# Patient Record
Sex: FEMALE | Race: OTHER | HISPANIC OR LATINO | URBAN - METROPOLITAN AREA
[De-identification: names, ages, dates, MRNs, and addresses within clinical notes are randomized per-mention and may not be internally consistent; named-entity substitution may affect disease eponyms.]

---

## 2023-09-21 ENCOUNTER — EMERGENCY (EMERGENCY)
Facility: HOSPITAL | Age: 58
LOS: 1 days | Discharge: LEFT BEFORE DISCHARGE | End: 2023-09-21
Attending: EMERGENCY MEDICINE | Admitting: EMERGENCY MEDICINE
Payer: SELF-PAY

## 2023-09-21 VITALS
SYSTOLIC BLOOD PRESSURE: 149 MMHG | HEIGHT: 64 IN | DIASTOLIC BLOOD PRESSURE: 69 MMHG | TEMPERATURE: 98 F | RESPIRATION RATE: 17 BRPM | HEART RATE: 100 BPM | OXYGEN SATURATION: 98 %

## 2023-09-21 DIAGNOSIS — I10 ESSENTIAL (PRIMARY) HYPERTENSION: ICD-10-CM

## 2023-09-21 DIAGNOSIS — Z53.21 PROCEDURE AND TREATMENT NOT CARRIED OUT DUE TO PATIENT LEAVING PRIOR TO BEING SEEN BY HEALTH CARE PROVIDER: ICD-10-CM

## 2023-09-21 PROCEDURE — L9991: CPT

## 2023-09-21 NOTE — ED PROVIDER NOTE - RAPID ASSESSMENT
Patient seen briefly by me, she had a concern about a nosebleed which resolved on arrival.  And an elevated blood pressure that she checked at home but also resolved on arrival.  She declined further evaluation

## 2023-09-21 NOTE — ED ADULT TRIAGE NOTE - CHIEF COMPLAINT QUOTE
patient walk in with family c/o nose bleed earlier in the day (now resolved) and HTN; took own enalapril 10mg 30 min ago for BP "over 200 systolic with [employer]"; BP in triage 149/69; denies any other medical complaints

## 2023-09-21 NOTE — ED ADULT NURSE NOTE - OBJECTIVE STATEMENT
Patient presents with family member with complaints of hypertension and nose bleeding earlier, now resolved, c/o headache and feeling a little stressed and anxious. Patient tool enalapril 10 mg before coming to ED. BP in triage 149/69. Denies chest pain, SOB, numbness or tingling sensation, weakness, dizziness.

## 2023-09-21 NOTE — ED ADULT TRIAGE NOTE - GLASGOW COMA SCALE: BEST VERBAL RESPONSE, MLM
-- Message is from the [facility]--    Provider paged via [employer] Documentation - The below message was copied and pasted from a [employer] page:    [phone] ACC NONURGENT CALLER NAME: [facility] RE: DOC JAMAL PATIENT 1991 PATIENT IS AT PHARMACY MEDICATION ISN'T THERE TREL* FORMID:[id number]      
Done, rx sent  
(V5) oriented

## 2023-11-06 NOTE — ED ADULT TRIAGE NOTE - BP NONINVASIVE DIASTOLIC (MM HG)
69 The patient's goals for the shift include      The clinical goals for the shift include pt. will be hemodynamically stable.    Problem: Fall/Injury  Goal: Not fall by end of shift  Outcome: Progressing  Goal: Be free from injury by end of the shift  Outcome: Progressing  Goal: Verbalize understanding of personal risk factors for fall in the hospital  Outcome: Progressing  Goal: Verbalize understanding of risk factor reduction measures to prevent injury from fall in the home  Outcome: Progressing  Goal: Use assistive devices by end of the shift  Outcome: Progressing  Goal: Pace activities to prevent fatigue by end of the shift  Outcome: Progressing     Problem: Skin  Goal: Decreased wound size/increased tissue granulation at next dressing change  Outcome: Progressing  Goal: Participates in plan/prevention/treatment measures  Outcome: Progressing  Goal: Prevent/manage excess moisture  Outcome: Progressing  Goal: Prevent/minimize sheer/friction injuries  Outcome: Progressing  Goal: Promote/optimize nutrition  Outcome: Progressing  Goal: Promote skin healing  Outcome: Progressing     Problem: Dialysis  Goal: I will slow progression of end-stage renal disease through participating in dialysis 3 times a week  Outcome: Progressing     Problem: Pain  Goal: Takes deep breaths with improved pain control throughout the shift  Outcome: Progressing  Goal: Turns in bed with improved pain control throughout the shift  Outcome: Progressing  Goal: Walks with improved pain control throughout the shift  Outcome: Progressing  Goal: Performs ADL's with improved pain control throughout shift  Outcome: Progressing  Goal: Participates in PT with improved pain control throughout the shift  Outcome: Progressing